# Patient Record
Sex: MALE | Race: AMERICAN INDIAN OR ALASKA NATIVE | Employment: FULL TIME | URBAN - METROPOLITAN AREA
[De-identification: names, ages, dates, MRNs, and addresses within clinical notes are randomized per-mention and may not be internally consistent; named-entity substitution may affect disease eponyms.]

---

## 2018-06-15 ENCOUNTER — HOSPITAL ENCOUNTER (OUTPATIENT)
Dept: PREADMISSION TESTING | Age: 43
Discharge: HOME | End: 2018-06-15
Attending: SURGERY
Payer: COMMERCIAL

## 2018-06-15 LAB
HCT VFR BLD AUTO: 43.7 % (ref 36–48)
HGB BLD-MCNC: 14.8 G/DL (ref 13–16)

## 2018-06-15 PROCEDURE — 85018 HEMOGLOBIN: CPT | Performed by: ANESTHESIOLOGY

## 2018-06-15 PROCEDURE — 36415 COLL VENOUS BLD VENIPUNCTURE: CPT | Performed by: ANESTHESIOLOGY

## 2018-06-20 ENCOUNTER — ANESTHESIA (OUTPATIENT)
Dept: SURGERY | Age: 43
End: 2018-06-20
Payer: COMMERCIAL

## 2018-06-20 ENCOUNTER — ANESTHESIA EVENT (OUTPATIENT)
Dept: SURGERY | Age: 43
End: 2018-06-20
Payer: COMMERCIAL

## 2018-06-20 ENCOUNTER — HOSPITAL ENCOUNTER (OUTPATIENT)
Age: 43
Setting detail: OUTPATIENT SURGERY
Discharge: HOME | End: 2018-06-20
Attending: SURGERY | Admitting: SURGERY
Payer: COMMERCIAL

## 2018-06-20 VITALS
TEMPERATURE: 97.6 F | HEIGHT: 73 IN | WEIGHT: 284.06 LBS | DIASTOLIC BLOOD PRESSURE: 70 MMHG | HEART RATE: 62 BPM | RESPIRATION RATE: 16 BRPM | BODY MASS INDEX: 37.65 KG/M2 | OXYGEN SATURATION: 100 % | SYSTOLIC BLOOD PRESSURE: 148 MMHG

## 2018-06-20 DIAGNOSIS — K60.2 ANAL FISSURE: Primary | ICD-10-CM

## 2018-06-20 PROCEDURE — 77030020782 HC GWN BAIR PAWS FLX 3M -B: Performed by: SURGERY

## 2018-06-20 PROCEDURE — 74011000250 HC RX REV CODE- 250: Performed by: SURGERY

## 2018-06-20 PROCEDURE — 77030002888 HC SUT CHRMC J&J -A: Performed by: SURGERY

## 2018-06-20 PROCEDURE — 74011250636 HC RX REV CODE- 250/636: Performed by: SURGERY

## 2018-06-20 PROCEDURE — 77010033678 HC OXYGEN DAILY

## 2018-06-20 PROCEDURE — 74011250636 HC RX REV CODE- 250/636

## 2018-06-20 PROCEDURE — 76210000006 HC OR PH I REC 0.5 TO 1 HR: Performed by: SURGERY

## 2018-06-20 PROCEDURE — 77030018836 HC SOL IRR NACL ICUM -A: Performed by: SURGERY

## 2018-06-20 PROCEDURE — 76010000138 HC OR TIME 0.5 TO 1 HR: Performed by: SURGERY

## 2018-06-20 PROCEDURE — 76060000032 HC ANESTHESIA 0.5 TO 1 HR: Performed by: SURGERY

## 2018-06-20 PROCEDURE — 76210000021 HC REC RM PH II 0.5 TO 1 HR: Performed by: SURGERY

## 2018-06-20 PROCEDURE — 77030011640 HC PAD GRND REM COVD -A: Performed by: SURGERY

## 2018-06-20 PROCEDURE — 74011000250 HC RX REV CODE- 250

## 2018-06-20 RX ORDER — INSULIN LISPRO 100 [IU]/ML
INJECTION, SOLUTION INTRAVENOUS; SUBCUTANEOUS ONCE
Status: DISCONTINUED | OUTPATIENT
Start: 2018-06-20 | End: 2018-06-20 | Stop reason: HOSPADM

## 2018-06-20 RX ORDER — METOCLOPRAMIDE HYDROCHLORIDE 5 MG/ML
INJECTION INTRAMUSCULAR; INTRAVENOUS AS NEEDED
Status: DISCONTINUED | OUTPATIENT
Start: 2018-06-20 | End: 2018-06-20 | Stop reason: HOSPADM

## 2018-06-20 RX ORDER — SODIUM CHLORIDE, SODIUM LACTATE, POTASSIUM CHLORIDE, CALCIUM CHLORIDE 600; 310; 30; 20 MG/100ML; MG/100ML; MG/100ML; MG/100ML
50 INJECTION, SOLUTION INTRAVENOUS CONTINUOUS
Status: DISCONTINUED | OUTPATIENT
Start: 2018-06-20 | End: 2018-06-20 | Stop reason: HOSPADM

## 2018-06-20 RX ORDER — ONDANSETRON 2 MG/ML
4 INJECTION INTRAMUSCULAR; INTRAVENOUS ONCE
Status: DISCONTINUED | OUTPATIENT
Start: 2018-06-20 | End: 2018-06-20 | Stop reason: HOSPADM

## 2018-06-20 RX ORDER — HYDROMORPHONE HYDROCHLORIDE 1 MG/ML
0.5 INJECTION, SOLUTION INTRAMUSCULAR; INTRAVENOUS; SUBCUTANEOUS
Status: DISCONTINUED | OUTPATIENT
Start: 2018-06-20 | End: 2018-06-20 | Stop reason: HOSPADM

## 2018-06-20 RX ORDER — BUPIVACAINE HYDROCHLORIDE 2.5 MG/ML
INJECTION, SOLUTION EPIDURAL; INFILTRATION; INTRACAUDAL AS NEEDED
Status: DISCONTINUED | OUTPATIENT
Start: 2018-06-20 | End: 2018-06-20 | Stop reason: HOSPADM

## 2018-06-20 RX ORDER — ONDANSETRON 2 MG/ML
INJECTION INTRAMUSCULAR; INTRAVENOUS AS NEEDED
Status: DISCONTINUED | OUTPATIENT
Start: 2018-06-20 | End: 2018-06-20 | Stop reason: HOSPADM

## 2018-06-20 RX ORDER — GLYCOPYRROLATE 0.2 MG/ML
INJECTION INTRAMUSCULAR; INTRAVENOUS AS NEEDED
Status: DISCONTINUED | OUTPATIENT
Start: 2018-06-20 | End: 2018-06-20 | Stop reason: HOSPADM

## 2018-06-20 RX ORDER — FENTANYL CITRATE 50 UG/ML
25 INJECTION, SOLUTION INTRAMUSCULAR; INTRAVENOUS
Status: ACTIVE | OUTPATIENT
Start: 2018-06-20 | End: 2018-06-20

## 2018-06-20 RX ORDER — OXYCODONE AND ACETAMINOPHEN 5; 325 MG/1; MG/1
1 TABLET ORAL AS NEEDED
Status: DISCONTINUED | OUTPATIENT
Start: 2018-06-20 | End: 2018-06-20 | Stop reason: HOSPADM

## 2018-06-20 RX ORDER — MAGNESIUM SULFATE 100 %
4 CRYSTALS MISCELLANEOUS AS NEEDED
Status: DISCONTINUED | OUTPATIENT
Start: 2018-06-20 | End: 2018-06-20 | Stop reason: HOSPADM

## 2018-06-20 RX ORDER — NALOXONE HYDROCHLORIDE 0.4 MG/ML
0.1 INJECTION, SOLUTION INTRAMUSCULAR; INTRAVENOUS; SUBCUTANEOUS
Status: DISCONTINUED | OUTPATIENT
Start: 2018-06-20 | End: 2018-06-20 | Stop reason: HOSPADM

## 2018-06-20 RX ORDER — OXYCODONE AND ACETAMINOPHEN 5; 325 MG/1; MG/1
1 TABLET ORAL
Qty: 5 TAB | Refills: 0 | Status: SHIPPED | OUTPATIENT
Start: 2018-06-20

## 2018-06-20 RX ORDER — DEXTROSE 50 % IN WATER (D50W) INTRAVENOUS SYRINGE
25-50 AS NEEDED
Status: DISCONTINUED | OUTPATIENT
Start: 2018-06-20 | End: 2018-06-20 | Stop reason: HOSPADM

## 2018-06-20 RX ORDER — DEXAMETHASONE SODIUM PHOSPHATE 4 MG/ML
INJECTION, SOLUTION INTRA-ARTICULAR; INTRALESIONAL; INTRAMUSCULAR; INTRAVENOUS; SOFT TISSUE AS NEEDED
Status: DISCONTINUED | OUTPATIENT
Start: 2018-06-20 | End: 2018-06-20 | Stop reason: HOSPADM

## 2018-06-20 RX ORDER — MIDAZOLAM HYDROCHLORIDE 1 MG/ML
INJECTION, SOLUTION INTRAMUSCULAR; INTRAVENOUS AS NEEDED
Status: DISCONTINUED | OUTPATIENT
Start: 2018-06-20 | End: 2018-06-20 | Stop reason: HOSPADM

## 2018-06-20 RX ORDER — SODIUM CHLORIDE 0.9 % (FLUSH) 0.9 %
5-10 SYRINGE (ML) INJECTION AS NEEDED
Status: DISCONTINUED | OUTPATIENT
Start: 2018-06-20 | End: 2018-06-20 | Stop reason: HOSPADM

## 2018-06-20 RX ORDER — FENTANYL CITRATE 50 UG/ML
INJECTION, SOLUTION INTRAMUSCULAR; INTRAVENOUS AS NEEDED
Status: DISCONTINUED | OUTPATIENT
Start: 2018-06-20 | End: 2018-06-20 | Stop reason: HOSPADM

## 2018-06-20 RX ORDER — PROPOFOL 10 MG/ML
INJECTION, EMULSION INTRAVENOUS AS NEEDED
Status: DISCONTINUED | OUTPATIENT
Start: 2018-06-20 | End: 2018-06-20 | Stop reason: HOSPADM

## 2018-06-20 RX ORDER — SODIUM CHLORIDE, SODIUM LACTATE, POTASSIUM CHLORIDE, CALCIUM CHLORIDE 600; 310; 30; 20 MG/100ML; MG/100ML; MG/100ML; MG/100ML
125 INJECTION, SOLUTION INTRAVENOUS CONTINUOUS
Status: DISCONTINUED | OUTPATIENT
Start: 2018-06-20 | End: 2018-06-20 | Stop reason: HOSPADM

## 2018-06-20 RX ORDER — LIDOCAINE HYDROCHLORIDE 20 MG/ML
INJECTION, SOLUTION EPIDURAL; INFILTRATION; INTRACAUDAL; PERINEURAL AS NEEDED
Status: DISCONTINUED | OUTPATIENT
Start: 2018-06-20 | End: 2018-06-20 | Stop reason: HOSPADM

## 2018-06-20 RX ORDER — KETOROLAC TROMETHAMINE 30 MG/ML
INJECTION, SOLUTION INTRAMUSCULAR; INTRAVENOUS AS NEEDED
Status: DISCONTINUED | OUTPATIENT
Start: 2018-06-20 | End: 2018-06-20 | Stop reason: HOSPADM

## 2018-06-20 RX ADMIN — METOCLOPRAMIDE HYDROCHLORIDE 10 MG: 5 INJECTION INTRAMUSCULAR; INTRAVENOUS at 15:50

## 2018-06-20 RX ADMIN — FENTANYL CITRATE 50 MCG: 50 INJECTION, SOLUTION INTRAMUSCULAR; INTRAVENOUS at 16:00

## 2018-06-20 RX ADMIN — PROPOFOL 50 MG: 10 INJECTION, EMULSION INTRAVENOUS at 15:56

## 2018-06-20 RX ADMIN — SODIUM CHLORIDE, SODIUM LACTATE, POTASSIUM CHLORIDE, AND CALCIUM CHLORIDE 125 ML/HR: 600; 310; 30; 20 INJECTION, SOLUTION INTRAVENOUS at 13:51

## 2018-06-20 RX ADMIN — MIDAZOLAM HYDROCHLORIDE 2 MG: 1 INJECTION, SOLUTION INTRAMUSCULAR; INTRAVENOUS at 15:34

## 2018-06-20 RX ADMIN — MIDAZOLAM HYDROCHLORIDE 3 MG: 1 INJECTION, SOLUTION INTRAMUSCULAR; INTRAVENOUS at 15:30

## 2018-06-20 RX ADMIN — GLYCOPYRROLATE 0.2 MG: 0.2 INJECTION INTRAMUSCULAR; INTRAVENOUS at 15:47

## 2018-06-20 RX ADMIN — ONDANSETRON 4 MG: 2 INJECTION INTRAMUSCULAR; INTRAVENOUS at 15:47

## 2018-06-20 RX ADMIN — PROPOFOL 50 MG: 10 INJECTION, EMULSION INTRAVENOUS at 15:37

## 2018-06-20 RX ADMIN — LIDOCAINE HYDROCHLORIDE 40 MG: 20 INJECTION, SOLUTION EPIDURAL; INFILTRATION; INTRACAUDAL; PERINEURAL at 15:36

## 2018-06-20 RX ADMIN — ONABOTULINUMTOXINA 100 UNITS: 100 INJECTION, POWDER, LYOPHILIZED, FOR SOLUTION INTRADERMAL; INTRAMUSCULAR at 15:57

## 2018-06-20 RX ADMIN — PROPOFOL 200 MG: 10 INJECTION, EMULSION INTRAVENOUS at 15:36

## 2018-06-20 RX ADMIN — KETOROLAC TROMETHAMINE 30 MG: 30 INJECTION, SOLUTION INTRAMUSCULAR; INTRAVENOUS at 15:54

## 2018-06-20 RX ADMIN — PROPOFOL 100 MG: 10 INJECTION, EMULSION INTRAVENOUS at 15:51

## 2018-06-20 RX ADMIN — FENTANYL CITRATE 50 MCG: 50 INJECTION, SOLUTION INTRAMUSCULAR; INTRAVENOUS at 15:57

## 2018-06-20 RX ADMIN — FENTANYL CITRATE 50 MCG: 50 INJECTION, SOLUTION INTRAMUSCULAR; INTRAVENOUS at 15:34

## 2018-06-20 RX ADMIN — FENTANYL CITRATE 50 MCG: 50 INJECTION, SOLUTION INTRAMUSCULAR; INTRAVENOUS at 15:45

## 2018-06-20 RX ADMIN — SODIUM CHLORIDE, SODIUM LACTATE, POTASSIUM CHLORIDE, AND CALCIUM CHLORIDE: 600; 310; 30; 20 INJECTION, SOLUTION INTRAVENOUS at 16:00

## 2018-06-20 RX ADMIN — DEXAMETHASONE SODIUM PHOSPHATE 4 MG: 4 INJECTION, SOLUTION INTRA-ARTICULAR; INTRALESIONAL; INTRAMUSCULAR; INTRAVENOUS; SOFT TISSUE at 15:45

## 2018-06-20 NOTE — ANESTHESIA PREPROCEDURE EVALUATION
Anesthetic History   No history of anesthetic complications            Review of Systems / Medical History  Patient summary reviewed, nursing notes reviewed and pertinent labs reviewed    Pulmonary  Within defined limits                 Neuro/Psych   Within defined limits           Cardiovascular  Within defined limits                     GI/Hepatic/Renal  Within defined limits              Endo/Other  Within defined limits           Other Findings              Physical Exam    Airway  Mallampati: II  TM Distance: 4 - 6 cm  Neck ROM: normal range of motion   Mouth opening: Normal     Cardiovascular  Regular rate and rhythm,  S1 and S2 normal,  no murmur, click, rub, or gallop             Dental  No notable dental hx       Pulmonary  Breath sounds clear to auscultation               Abdominal  GI exam deferred       Other Findings            Anesthetic Plan    ASA: 1  Anesthesia type: general            Anesthetic plan and risks discussed with: Patient

## 2018-06-20 NOTE — IP AVS SNAPSHOT
[address] [address] 
[phone] Patient: Adelaida Martin MRN: [id number] :1975 About your hospitalization You were admitted on:  2018 You last received care in the:  [facility] PHASE 2 RECOVERY You were discharged on:  2018 Why you were hospitalized Your primary diagnosis was:  Not on File Your diagnoses also included:  Anal Fissure Follow-up Information Follow up With Details Comments Contact Info Be Thomas MD   [address] [place] [address] 
[phone] Pennie Crump MD Follow up in 10 day(s) Call to schedule follow up appointment 1 [facility] [address] [facility] 
[phone] Discharge Orders None A check ze indicates which time of day the medication should be taken. My Medications START taking these medications Instructions Each Dose to Equal  
 Morning Noon Evening Bedtime  
 oxyCODONE-acetaminophen 5-325 mg per tablet Commonly known as:  PERCOCET Your last dose was: Your next dose is: Take 1 Tab by mouth every six (6) hours as needed for Pain. Max Daily Amount: 4 Tabs. 1 Tab CONTINUE taking these medications Instructions Each Dose to Equal  
 Morning Noon Evening Bedtime AMBIEN 5 mg tablet Generic drug:  zolpidem Your last dose was: Your next dose is: Take 5 mg by mouth nightly. Indications: SLEEP-ONSET INSOMNIA  
 5 mg  
    
   
   
   
  
 ibuprofen 200 mg tablet Commonly known as:  MOTRIN Your last dose was: Your next dose is: Take 200 mg by mouth. Indications: Pain 200 mg  
    
   
   
   
  
 SPRYCEL 80 mg Tab Generic drug:  dasatinib Your last dose was: Your next dose is: Take 1 Tab by mouth daily. 1 Tab ULORIC 40 mg Tab tablet Generic drug:  febuxostat Your last dose was: Your next dose is: Take 40 mg by mouth daily. Indications: prevention of acute gout attack 40 mg Where to Get Your Medications Information on where to get these meds will be given to you by the nurse or doctor. ! Ask your nurse or doctor about these medications  
  oxyCODONE-acetaminophen 5-325 mg per tablet Opioid Education Prescription Opioids: What You Need to Know: 
 
 
Acute Diagnoses: ANAL FISSURE Chronic Medical Diagnoses: 
Problem List as of 6/20/2018  Date Reviewed: 6/20/2018 Codes Class Noted - Resolved Anal fissure ICD-10-CM: K60.2 ICD-9-CM: 565.0  6/20/2018 - Present Diet 1. Resume prior to surgery diet as tolerated. Activity 1. Do not drive a car or operate any hazardous machinery the day of surgery. 2. Rest quietly today. 3. No bending or heavy lifting. 4. You may resume other prior to surgery activities as tolerated. 5. You may remove the bandage and shower in 1 day. Drain / Wound Care 1. Follow all drain / wound care instructions exactly as explained by the Nurse at time of discharge. 2. Apply an ice pack to the surgical site for 48 hours. 3. Do not put any salves or ointments on the wound. Allow it to form a dry scab. 4. Leave steri-strips / Dermabond alone. They should be allowed to fall off on their own in 7-14 days. Medications 1. It is important to take your medications exactly as they are prescribed. 2. Keep your medication in the bottles provided by the pharmacist, and keep a list of the medication names, dosages, and times they should be taken in your wallet. Call 911 anytime you think you may need emergency care. For example, call if: 
· You passed out (lost consciousness). · You have severe trouble breathing. · You have sudden chest pain and shortness of breath. Notify your Surgeon for any of the followin. Fever, chills, nausea, vomiting, severe abdominal pain or bleeding. 2. If you experience any redness or discharge or sign of infection. 3. Persistent nausea lasting more than 24 hours. If you are unable to reach your Surgeon for any of the symptoms above, you should proceed directly to the nearest Emergency Department. Post-Operative Appointment Information Call Dr. Xiang Romano office tomorrow morning at [phone] to schedule a post-operative office visit in one (1) week. If any questions or concerns arise, call your Surgeon at [phone]. DISCHARGE SUMMARY from Nurse PATIENT INSTRUCTIONS: 
 
 
F-face looks uneven A-arms unable to move or move unevenly S-speech slurred or non-existent T-time-call 911 as soon as signs and symptoms begin-DO NOT go Back to bed or wait to see if you get better-TIME IS BRAIN. Warning Signs of HEART ATTACK Call 911 if you have these symptoms: 
? Chest discomfort.  Most heart attacks involve discomfort in the center of the chest that lasts more than a few minutes, or that goes away and comes back. It can feel like uncomfortable pressure, squeezing, fullness, or pain. ? Discomfort in other areas of the upper body. Symptoms can include pain or discomfort in one or both arms, the back, neck, jaw, or stomach. ? Shortness of breath with or without chest discomfort. ? Other signs may include breaking out in a cold sweat, nausea, or lightheadedness. Don't wait more than five minutes to call [address]! Fast action can save your life. Calling 911 is almost always the fastest way to get lifesaving treatment. Emergency Medical Services staff can begin treatment when they arrive  up to an hour sooner than if someone gets to the hospital by car. Patient armband removed and shredded The discharge information has been reviewed with the patient and caregiver. The patient and caregiver verbalized understanding. Discharge medications reviewed with the patient and caregiver and appropriate educational materials and side effects teaching were provided. ___________________________________________________________________________________________________________________________________ Introducing [facility]! [employer] introduces creads patient portal. Now you can access parts of your medical record, email your doctor's office, and request medication refills online. 1. In your internet browser, go to [email or web address] 2. Click on the First Time User? Click Here link in the Sign In box. You will see the New Member Sign Up page. 3. Enter your creads Access Code exactly as it appears below. You will not need to use this code after youve completed the sign-up process. If you do not sign up before the expiration date, you must request a new code. · creads Access Code: [email or web address] Expires: 9/13/2018 11:19 AM 
 
4.  Enter the last four digits of your Social Security Number (xxxx) and Date of Birth (mm/dd/yyyy) as indicated and click Submit. You will be taken to the next sign-up page. 5. Create a Mailgunt ID. This will be your WorkMeIn login ID and cannot be changed, so think of one that is secure and easy to remember. 6. Create a Mailgunt password. You can change your password at any time. 7. Enter your Password Reset Question and Answer. This can be used at a later time if you forget your password. 8. Enter your e-mail address. You will receive e-mail notification when new information is available in [address] 9. Click Sign Up. You can now view and download portions of your medical record. 10. Click the Download Summary menu link to download a portable copy of your medical information. If you have questions, please visit the Frequently Asked Questions section of the WorkMeIn website. Remember, WorkMeIn is NOT to be used for urgent needs. For medical emergencies, dial 911. Now available from your iPhone and Android! Introducing Sin Valenzuela As a [employer] patient, I wanted to make you aware of our electronic visit tool called Sin Valenzuela. [employer] 24/7 allows you to connect within minutes with a medical provider 24 hours a day, seven days a week via a mobile device or tablet or logging into a secure website from your computer. You can access Sin Valenzuela from anywhere in the [place]. A virtual visit might be right for you when you have a simple condition and feel like you just dont want to get out of bed, or cant get away from work for an appointment, when your regular [employer] provider is not available (evenings, weekends or holidays), or when youre out of town and need minor care. Electronic visits cost only $49 and if the [employer] 24/7 provider determines a prescription is needed to treat your condition, one can be electronically transmitted to a nearby pharmacy*. Please take a moment to enroll today if you have not already done so. The enrollment process is free and takes just a few minutes. To enroll, please download the [employer] 24/7 bartolo to your tablet or phone, or visit [email or web address] to enroll on your computer. And, as an [address] [place] [address] patient with a [employer] account, the results of your visits will be scanned into your electronic medical record and your primary care provider will be able to view the scanned results. We urge you to continue to see your regular [employer] provider for your ongoing medical care. And while your primary care provider may not be the one available when you seek a [employer] virtual visit, the peace of mind you get from getting a real diagnosis real time can be priceless. For more information on [employer], view our Frequently Asked Questions (FAQs) at [email or web address]. Sincerely, 
 
Amparo Gamble MD 
Chief Medical Officer [facility] Dalia Lazaro *:  certain medications cannot be prescribed via [employer] Providers Seen During Your Hospitalization Provider Specialty Primary office phone Marcus Ortiz MD General Surgery [phone] Your Primary Care Physician (PCP) Primary Care Physician Office Phone Office Fax Bette Wilson [phone] You are allergic to the following No active allergies Recent Documentation Height Weight BMI Smoking Status 1.854 m 128.8 kg 37.48 kg/m2 Former Smoker Emergency Contacts Name Discharge Info Relation Home Work Mobile Gregg Anguloie DISCHARGE CAREGIVER [3] Spouse [3]   [phone] Patient Belongings  The following personal items are in your possession at time of discharge: 
  Dental Appliances: None  Visual Aid: None      Home Medications: None   Jewelry: None  Clothing: Shorts, Shirt, Undergarments, Footwear, Socks (with Rahul Chávez)    Other Valuables: Wallet (with Rahul Chávez) Please provide this summary of care documentation to your next provider. Signatures-by signing, you are acknowledging that this After Visit Summary has been reviewed with you and you have received a copy. Patient Signature:  ____________________________________________________________ Date:  ____________________________________________________________  
  
[facility] Provider Signature:  ____________________________________________________________ Date:  ____________________________________________________________

## 2018-06-20 NOTE — BRIEF OP NOTE
BRIEF OPERATIVE NOTE    Date of Procedure: 6/20/2018   Preoperative Diagnosis: ANAL FISSURE  Postoperative Diagnosis: ANAL FISSURE    Procedure(s):  BOTOX INJECTION ANAL FISSURE  Surgeon(s) and Role:     * Nyasia Story MD - Primary         Surgical Assistant: none    Surgical Staff:  Circ-1: Daquan Peace  Scrub Tech-1: Shan Meneses  Surg Asst-1: Luis F Rivera  Event Time In   Incision Start 1556   Incision Close 1600     Anesthesia: General   Estimated Blood Loss: min  Specimens: * No specimens in log *   Findings: anal fissure   Complications: none  Implants: * No implants in log *

## 2018-06-20 NOTE — DISCHARGE INSTRUCTIONS
Post-Operative Discharge Instructions  Marielos Ogden. Manual Duverney, M.D.  [address] [place] [address] Miguel Roberts  [phone]    Patient: Michell James MRN: [id number]  CSN: [id number]    YOB: 1975  Age: 44 y.o. Sex: male    DOA: 2018 LOS:  LOS: 0 days   Discharge Date:      Acute Diagnoses:  ANAL FISSURE    Chronic Medical Diagnoses:  Problem List as of 2018  Date Reviewed: 2018          Codes Class Noted - Resolved    Anal fissure ICD-10-CM: K60.2  ICD-9-CM: 565.0  2018 - Present              Diet  1. Resume prior to surgery diet as tolerated. Activity  1. Do not drive a car or operate any hazardous machinery the day of surgery. 2. Rest quietly today. 3. No bending or heavy lifting. 4. You may resume other prior to surgery activities as tolerated. 5. You may remove the bandage and shower in 1 day. Drain / Wound Care  1. Follow all drain / wound care instructions exactly as explained by the Nurse at time of discharge. 2. Apply an ice pack to the surgical site for 48 hours. 3. Do not put any salves or ointments on the wound. Allow it to form a dry scab. 4. Leave steri-strips / Dermabond alone. They should be allowed to fall off on their own in 7-14 days. Medications  1. It is important to take your medications exactly as they are prescribed. 2. Keep your medication in the bottles provided by the pharmacist, and keep a list of the medication names, dosages, and times they should be taken in your wallet. Call 911 anytime you think you may need emergency care. For example, call if:  · You passed out (lost consciousness). · You have severe trouble breathing. · You have sudden chest pain and shortness of breath. Notify your Surgeon for any of the followin. Fever, chills, nausea, vomiting, severe abdominal pain or bleeding. 2. If you experience any redness or discharge or sign of infection.   3. Persistent nausea lasting more than 24 hours.    If you are unable to reach your Surgeon for any of the symptoms above, you should proceed directly to the nearest Emergency Department. Post-Operative Appointment Information    Call Dr. Gio Londono office tomorrow morning at [phone] to schedule a post-operative office visit in one (1) week. If any questions or concerns arise, call your Surgeon at [phone]. DISCHARGE SUMMARY from Nurse    PATIENT INSTRUCTIONS:    After general anesthesia or intravenous sedation, for 24 hours or while taking prescription Narcotics:  · Limit your activities  · Do not drive and operate hazardous machinery  · Do not make important personal or business decisions  · Do  not drink alcoholic beverages  · If you have not urinated within 8 hours after discharge, please contact your surgeon on call. Report the following to your surgeon:  · Excessive pain, swelling, redness or odor of or around the surgical area  · Temperature over 100.5  · Nausea and vomiting lasting longer than 4 hours or if unable to take medications  · Any signs of decreased circulation or nerve impairment to extremity: change in color, persistent  numbness, tingling, coldness or increase pain  · Any questions    What to do at Home:  Recommended activity: No heavy lifting, pushing, pulling     If you experience any of the following symptoms above, please follow up with Dr. Vasquez Duenas. *  Please give a list of your current medications to your Primary Care Provider. *  Please update this list whenever your medications are discontinued, doses are      changed, or new medications (including over-the-counter products) are added. *  Please carry medication information at all times in case of emergency situations.     These are general instructions for a healthy lifestyle:    No smoking/ No tobacco products/ Avoid exposure to second hand smoke  Surgeon General's Warning:  Quitting smoking now greatly reduces serious risk to your health. Obesity, smoking, and sedentary lifestyle greatly increases your risk for illness    A healthy diet, regular physical exercise & weight monitoring are important for maintaining a healthy lifestyle    You may be retaining fluid if you have a history of heart failure or if you experience any of the following symptoms:  Weight gain of 3 pounds or more overnight or 5 pounds in a week, increased swelling in our hands or feet or shortness of breath while lying flat in bed. Please call your doctor as soon as you notice any of these symptoms; do not wait until your next office visit. Recognize signs and symptoms of STROKE:    F-face looks uneven    A-arms unable to move or move unevenly    S-speech slurred or non-existent    T-time-call 911 as soon as signs and symptoms begin-DO NOT go       Back to bed or wait to see if you get better-TIME IS BRAIN. Warning Signs of HEART ATTACK     Call 911 if you have these symptoms:   Chest discomfort. Most heart attacks involve discomfort in the center of the chest that lasts more than a few minutes, or that goes away and comes back. It can feel like uncomfortable pressure, squeezing, fullness, or pain.  Discomfort in other areas of the upper body. Symptoms can include pain or discomfort in one or both arms, the back, neck, jaw, or stomach.  Shortness of breath with or without chest discomfort.  Other signs may include breaking out in a cold sweat, nausea, or lightheadedness. Don't wait more than five minutes to call 911 - MINUTES MATTER! Fast action can save your life. Calling 911 is almost always the fastest way to get lifesaving treatment. Emergency Medical Services staff can begin treatment when they arrive -- up to an hour sooner than if someone gets to the hospital by car. Patient armband removed and shredded    The discharge information has been reviewed with the patient and caregiver. The patient and caregiver verbalized understanding.   Discharge medications reviewed with the patient and caregiver and appropriate educational materials and side effects teaching were provided.   ___________________________________________________________________________________________________________________________________

## 2018-06-20 NOTE — H&P
History & Physical    Patient: Crystal Ahumada MRN: [id number]  CSN: [id number]    YOB: 1975  Age: 44 y.o. Sex: male      DOA: 6/20/2018       HPI:     Crystal Ahumada is a 44 y.o. male who presents with anal pain and what seems like a fissure. Past Medical History:   Diagnosis Date    Gout     Leukemia ([address] 2017    CML       Past Surgical History:   Procedure Laterality Date    HX ORTHOPAEDIC Right     fx wrist       No family history on file. Social History     Social History    Marital status:      Spouse name: N/A    Number of children: N/A    Years of education: N/A     Social History Main Topics    Smoking status: Former Smoker     Years: 4.00     Quit date: 3/18/2018    Smokeless tobacco: Never Used    Alcohol use 25.2 oz/week     42 Cans of beer per week    Drug use: No    Sexual activity: Yes     Other Topics Concern    Not on file     Social History Narrative    No narrative on file       Prior to Admission medications    Medication Sig Start Date End Date Taking? Authorizing Provider   dasatinib (SPRYCEL) 80 mg tab Take 1 Tab by mouth daily. Historical Provider   febuxostat (ULORIC) 40 mg tab tablet Take 40 mg by mouth daily. Indications: prevention of acute gout attack    Historical Provider   zolpidem (AMBIEN) 5 mg tablet Take 5 mg by mouth nightly. Indications: SLEEP-ONSET INSOMNIA    Historical Provider   ibuprofen (MOTRIN) 200 mg tablet Take 200 mg by mouth. Indications: Pain    Historical Provider       No Known Allergies    Review of Systems  A comprehensive review of systems was negative except for that written in the History of Present Illness. Physical Exam:      There were no vitals taken for this visit. Physical Exam:  Physical Exam:   General:  Alert, cooperative, no distress, appears stated age. Eyes:  Conjunctivae/corneas clear. PERRL, EOMs intact. Fundi benign   Ears:  Normal TMs and external ear canals both ears.    Nose: Nares normal. Septum midline. Mucosa normal. No drainage or sinus tenderness. Mouth/Throat: Lips, mucosa, and tongue normal. Teeth and gums normal.   Neck: Supple, symmetrical, trachea midline, no adenopathy, thyroid: no enlargement/tenderness/nodules, no carotid bruit and no JVD. Back:   Symmetric, no curvature. ROM normal. No CVA tenderness. Lungs:   Clear to auscultation bilaterally. Heart:  Regular rate and rhythm, S1, S2 normal, no murmur, click, rub or gallop. Abdomen:   Soft, non-tender. Bowel sounds normal. No masses,  No organomegaly. Post. Anal fissure, anal pain , hard to examine   Extremities: Extremities normal, atraumatic, no cyanosis or edema. Pulses: 2+ and symmetric all extremities. Skin: Skin color, texture, turgor normal. No rashes or lesions   Lymph nodes: Cervical, supraclavicular, and axillary nodes normal.   Neurologic: CNII-XII intact. Normal strength, sensation and reflexes throughout. Labs Reviewed: All lab results for the last 24 hours reviewed. Assessment/Plan     Active Problems:    * No active hospital problems. *      Will nate EUA and botox injection, discussed risks.

## 2018-06-24 NOTE — ANESTHESIA POSTPROCEDURE EVALUATION
Post-Anesthesia Evaluation and Assessment    Cardiovascular Function/Vital Signs  Visit Vitals    /70 (BP 1 Location: Left arm, BP Patient Position: At rest;Sitting)    Pulse 62    Temp 36.4 °C (97.6 °F)    Resp 16    Ht 6' 1\" (1.854 m)    Wt 128.8 kg (284 lb 1 oz)    SpO2 100%    BMI 37.48 kg/m2       Patient is status post Procedure(s):  BOTOX INJECTION ANAL FISSURE. Nausea/Vomiting: Controlled. Postoperative hydration reviewed and adequate. Pain:  Pain Scale 1: Numeric (0 - 10) (06/20/18 1740)  Pain Intensity 1: 2 (06/20/18 1740)   Managed. Neurological Status:   Neuro (WDL): Within Defined Limits (06/20/18 1740)   At baseline. Mental Status and Level of Consciousness: Baseline and stable. Pulmonary Status:   O2 Device: Nasal cannula (06/20/18 1615)   Adequate oxygenation and airway patent. Complications related to anesthesia: None    Post-anesthesia assessment completed. No concerns. Patient has met all discharge requirements.     Signed By: Bakari Correa MD

## 2018-06-25 NOTE — OP NOTES
[facility]    Marine Otto  MR#: [id number]  : 1975  ACCOUNT #: [de-identified]   DATE OF SERVICE: 2018    PREOPERATIVE DIAGNOSES:  Anal pain and anal fissure. POSTOPERATIVE DIAGNOSES:  Anal pain and anal fissure. PROCEDURE PERFORMED:  Chemical denervation of anal sphincter with Botox. SURGEON:  Trip Ortiz MD    ANESTHESIA:  AMC with local.    ESTIMATED BLOOD LOSS:  Minimal.    SPECIMENS REMOVED:  None. DRAINS:  None. IMPLANTS:  None. COMPLICATIONS:  None. IMPLANTS:  none    ASSISTANT:  None. INDICATIONS:  The patient with severe anal pain, difficult to examine in the office. He will undergo examination under anesthesia and what appears to be an anal fissure will undergo chemical denervation with Botox. PROCEDURE:  He was placed in the lithotomy position and his perineum was prepped and draped in usual fashion. 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 There were no abnormalities noted or masses. Chemical denervation was done with 100 units of Botox in usual fashion bilaterally into the internal anal sphincter. Once this was done, 0.25% Marcaine was injected for postoperative anesthesia. The wound was dressed with Xeroform gauze. Patient tolerated the procedure well.       Savana Levine MD       [address]ri[address] / Marielos Crook  D: 2018 08:35     T: 2018 11:30  JOB #: [id number]

## (undated) DEVICE — (D)GLOVE SURG TRIFLX 7.5 PWD L -- DISC BY MFR USE ITEM 302993

## (undated) DEVICE — 3L THIN WALL CAN: Brand: CRD

## (undated) DEVICE — STERILE POLYISOPRENE POWDER-FREE SURGICAL GLOVES WITH EMOLLIENT COATING: Brand: PROTEXIS

## (undated) DEVICE — GOWN,AURORA,NONRNF,XL,30/CS: Brand: MEDLINE

## (undated) DEVICE — DRAPE TWL SURG 16X26IN BLU ORB04] ALLCARE INC]

## (undated) DEVICE — INTENDED FOR TISSUE SEPARATION, AND OTHER PROCEDURES THAT REQUIRE A SHARP SURGICAL BLADE TO PUNCTURE OR CUT.: Brand: BARD-PARKER ® CARBON RIB-BACK BLADES

## (undated) DEVICE — SUT CHRMC 3-0 27IN SH BRN --

## (undated) DEVICE — PACK,LITHOTOMY,PK IV,AURORA: Brand: MEDLINE

## (undated) DEVICE — SOL IRRIGATION INJ NACL 0.9% 500ML BTL

## (undated) DEVICE — DEVON™ KNEE AND BODY STRAP 60" X 3" (1.5 M X 7.6 CM): Brand: DEVON

## (undated) DEVICE — STERILE POLYISOPRENE POWDER-FREE SURGICAL GLOVES: Brand: PROTEXIS

## (undated) DEVICE — BSHR MAJOR BASIN PACK-LF: Brand: MEDLINE INDUSTRIES, INC.

## (undated) DEVICE — REM POLYHESIVE ADULT PATIENT RETURN ELECTRODE: Brand: VALLEYLAB